# Patient Record
Sex: MALE | Race: BLACK OR AFRICAN AMERICAN | Employment: OTHER | URBAN - METROPOLITAN AREA
[De-identification: names, ages, dates, MRNs, and addresses within clinical notes are randomized per-mention and may not be internally consistent; named-entity substitution may affect disease eponyms.]

---

## 2020-11-22 ENCOUNTER — HOSPITAL ENCOUNTER (INPATIENT)
Facility: HOSPITAL | Age: 75
LOS: 5 days | Discharge: HOME | DRG: 246 | End: 2020-11-27
Attending: EMERGENCY MEDICINE | Admitting: HOSPITALIST
Payer: OTHER GOVERNMENT

## 2020-11-22 ENCOUNTER — APPOINTMENT (OUTPATIENT)
Dept: CT IMAGING | Facility: HOSPITAL | Age: 75
DRG: 246 | End: 2020-11-22
Attending: EMERGENCY MEDICINE
Payer: OTHER GOVERNMENT

## 2020-11-22 ENCOUNTER — APPOINTMENT (OUTPATIENT)
Dept: INTERVENTIONAL RADIOLOGY/VASCULAR | Facility: HOSPITAL | Age: 75
DRG: 246 | End: 2020-11-22
Attending: INTERNAL MEDICINE
Payer: OTHER GOVERNMENT

## 2020-11-22 ENCOUNTER — APPOINTMENT (OUTPATIENT)
Dept: GENERAL RADIOLOGY | Facility: HOSPITAL | Age: 75
DRG: 246 | End: 2020-11-22
Attending: INTERNAL MEDICINE
Payer: OTHER GOVERNMENT

## 2020-11-22 ENCOUNTER — APPOINTMENT (OUTPATIENT)
Dept: CV DIAGNOSTICS | Facility: HOSPITAL | Age: 75
DRG: 246 | End: 2020-11-22
Attending: INTERNAL MEDICINE
Payer: OTHER GOVERNMENT

## 2020-11-22 DIAGNOSIS — I21.3 ST ELEVATION MYOCARDIAL INFARCTION (STEMI), UNSPECIFIED ARTERY (HCC): Primary | ICD-10-CM

## 2020-11-22 PROCEDURE — 71275 CT ANGIOGRAPHY CHEST: CPT | Performed by: EMERGENCY MEDICINE

## 2020-11-22 PROCEDURE — 4A023N7 MEASUREMENT OF CARDIAC SAMPLING AND PRESSURE, LEFT HEART, PERCUTANEOUS APPROACH: ICD-10-PCS | Performed by: INTERNAL MEDICINE

## 2020-11-22 PROCEDURE — B2151ZZ FLUOROSCOPY OF LEFT HEART USING LOW OSMOLAR CONTRAST: ICD-10-PCS | Performed by: INTERNAL MEDICINE

## 2020-11-22 PROCEDURE — 74175 CTA ABDOMEN W/CONTRAST: CPT | Performed by: EMERGENCY MEDICINE

## 2020-11-22 PROCEDURE — B2111ZZ FLUOROSCOPY OF MULTIPLE CORONARY ARTERIES USING LOW OSMOLAR CONTRAST: ICD-10-PCS | Performed by: INTERNAL MEDICINE

## 2020-11-22 PROCEDURE — 99223 1ST HOSP IP/OBS HIGH 75: CPT | Performed by: INTERNAL MEDICINE

## 2020-11-22 PROCEDURE — 93306 TTE W/DOPPLER COMPLETE: CPT | Performed by: INTERNAL MEDICINE

## 2020-11-22 PROCEDURE — 027035Z DILATION OF CORONARY ARTERY, ONE ARTERY WITH TWO DRUG-ELUTING INTRALUMINAL DEVICES, PERCUTANEOUS APPROACH: ICD-10-PCS | Performed by: INTERNAL MEDICINE

## 2020-11-22 PROCEDURE — 71045 X-RAY EXAM CHEST 1 VIEW: CPT | Performed by: INTERNAL MEDICINE

## 2020-11-22 RX ORDER — FUROSEMIDE 10 MG/ML
INJECTION INTRAMUSCULAR; INTRAVENOUS
Status: COMPLETED
Start: 2020-11-22 | End: 2020-11-22

## 2020-11-22 RX ORDER — HEPARIN SODIUM AND DEXTROSE 10000; 5 [USP'U]/100ML; G/100ML
12 INJECTION INTRAVENOUS ONCE
Status: COMPLETED | OUTPATIENT
Start: 2020-11-22 | End: 2020-11-22

## 2020-11-22 RX ORDER — VERAPAMIL HYDROCHLORIDE 2.5 MG/ML
INJECTION, SOLUTION INTRAVENOUS
Status: COMPLETED
Start: 2020-11-22 | End: 2020-11-22

## 2020-11-22 RX ORDER — HEPARIN SODIUM AND DEXTROSE 10000; 5 [USP'U]/100ML; G/100ML
12 INJECTION INTRAVENOUS ONCE
Status: DISCONTINUED | OUTPATIENT
Start: 2020-11-22 | End: 2020-11-22

## 2020-11-22 RX ORDER — FUROSEMIDE 10 MG/ML
40 INJECTION INTRAMUSCULAR; INTRAVENOUS
Status: DISCONTINUED | OUTPATIENT
Start: 2020-11-22 | End: 2020-11-24

## 2020-11-22 RX ORDER — WARFARIN SODIUM 5 MG/1
5 TABLET ORAL NIGHTLY
Status: DISCONTINUED | OUTPATIENT
Start: 2020-11-22 | End: 2020-11-24

## 2020-11-22 RX ORDER — MIDAZOLAM HYDROCHLORIDE 1 MG/ML
INJECTION INTRAMUSCULAR; INTRAVENOUS
Status: COMPLETED
Start: 2020-11-22 | End: 2020-11-22

## 2020-11-22 RX ORDER — LIDOCAINE HYDROCHLORIDE 20 MG/ML
INJECTION, SOLUTION EPIDURAL; INFILTRATION; INTRACAUDAL; PERINEURAL
Status: COMPLETED
Start: 2020-11-22 | End: 2020-11-22

## 2020-11-22 RX ORDER — NITROGLYCERIN 20 MG/100ML
20 INJECTION INTRAVENOUS CONTINUOUS
Status: DISCONTINUED | OUTPATIENT
Start: 2020-11-22 | End: 2020-11-25 | Stop reason: ALTCHOICE

## 2020-11-22 RX ORDER — ASPIRIN 81 MG/1
324 TABLET, CHEWABLE ORAL ONCE
Status: COMPLETED | OUTPATIENT
Start: 2020-11-22 | End: 2020-11-22

## 2020-11-22 RX ORDER — METOPROLOL TARTRATE 5 MG/5ML
5 INJECTION INTRAVENOUS ONCE
Status: COMPLETED | OUTPATIENT
Start: 2020-11-22 | End: 2020-11-22

## 2020-11-22 RX ORDER — POTASSIUM CHLORIDE 20 MEQ/1
40 TABLET, EXTENDED RELEASE ORAL EVERY 4 HOURS
Status: COMPLETED | OUTPATIENT
Start: 2020-11-22 | End: 2020-11-22

## 2020-11-22 RX ORDER — HEPARIN SODIUM 5000 [USP'U]/ML
5000 INJECTION, SOLUTION INTRAVENOUS; SUBCUTANEOUS EVERY 8 HOURS SCHEDULED
Status: DISCONTINUED | OUTPATIENT
Start: 2020-11-22 | End: 2020-11-22

## 2020-11-22 RX ORDER — HEPARIN SODIUM 1000 [USP'U]/ML
INJECTION, SOLUTION INTRAVENOUS; SUBCUTANEOUS
Status: COMPLETED
Start: 2020-11-22 | End: 2020-11-22

## 2020-11-22 RX ORDER — TRAMADOL HYDROCHLORIDE 50 MG/1
100 TABLET ORAL EVERY 6 HOURS PRN
Status: DISCONTINUED | OUTPATIENT
Start: 2020-11-22 | End: 2020-11-27

## 2020-11-22 RX ORDER — HEPARIN SODIUM AND DEXTROSE 10000; 5 [USP'U]/100ML; G/100ML
INJECTION INTRAVENOUS CONTINUOUS
Status: DISCONTINUED | OUTPATIENT
Start: 2020-11-22 | End: 2020-11-27

## 2020-11-22 RX ORDER — CARVEDILOL 3.12 MG/1
3.12 TABLET ORAL ONCE
Status: COMPLETED | OUTPATIENT
Start: 2020-11-22 | End: 2020-11-22

## 2020-11-22 RX ORDER — MORPHINE SULFATE 4 MG/ML
4 INJECTION, SOLUTION INTRAMUSCULAR; INTRAVENOUS ONCE
Status: COMPLETED | OUTPATIENT
Start: 2020-11-22 | End: 2020-11-22

## 2020-11-22 RX ORDER — ISOSORBIDE MONONITRATE 30 MG/1
30 TABLET, EXTENDED RELEASE ORAL DAILY
Status: DISCONTINUED | OUTPATIENT
Start: 2020-11-22 | End: 2020-11-25

## 2020-11-22 RX ORDER — FAMOTIDINE 20 MG/1
20 TABLET ORAL DAILY
Status: DISCONTINUED | OUTPATIENT
Start: 2020-11-22 | End: 2020-11-25

## 2020-11-22 RX ORDER — ACETAMINOPHEN 325 MG/1
650 TABLET ORAL EVERY 4 HOURS PRN
Status: DISCONTINUED | OUTPATIENT
Start: 2020-11-22 | End: 2020-11-27

## 2020-11-22 RX ORDER — CARVEDILOL 6.25 MG/1
6.25 TABLET ORAL 2 TIMES DAILY WITH MEALS
Status: DISCONTINUED | OUTPATIENT
Start: 2020-11-22 | End: 2020-11-24

## 2020-11-22 RX ORDER — HEPARIN SODIUM 1000 [USP'U]/ML
5000 INJECTION, SOLUTION INTRAVENOUS; SUBCUTANEOUS ONCE
Status: COMPLETED | OUTPATIENT
Start: 2020-11-22 | End: 2020-11-22

## 2020-11-22 RX ORDER — TRAMADOL HYDROCHLORIDE 50 MG/1
50 TABLET ORAL EVERY 6 HOURS PRN
Status: DISCONTINUED | OUTPATIENT
Start: 2020-11-22 | End: 2020-11-27

## 2020-11-22 RX ORDER — ATORVASTATIN CALCIUM 40 MG/1
80 TABLET, FILM COATED ORAL NIGHTLY
Status: DISCONTINUED | OUTPATIENT
Start: 2020-11-22 | End: 2020-11-27

## 2020-11-22 RX ORDER — POTASSIUM CHLORIDE 20 MEQ/1
20 TABLET, EXTENDED RELEASE ORAL ONCE
Status: COMPLETED | OUTPATIENT
Start: 2020-11-22 | End: 2020-11-22

## 2020-11-22 RX ORDER — NITROGLYCERIN 20 MG/100ML
INJECTION INTRAVENOUS
Status: COMPLETED
Start: 2020-11-22 | End: 2020-11-22

## 2020-11-22 RX ORDER — FUROSEMIDE 10 MG/ML
20 INJECTION INTRAMUSCULAR; INTRAVENOUS ONCE
Status: COMPLETED | OUTPATIENT
Start: 2020-11-22 | End: 2020-11-22

## 2020-11-22 RX ORDER — ONDANSETRON 2 MG/ML
4 INJECTION INTRAMUSCULAR; INTRAVENOUS EVERY 6 HOURS PRN
Status: DISCONTINUED | OUTPATIENT
Start: 2020-11-22 | End: 2020-11-27

## 2020-11-22 RX ORDER — HYDRALAZINE HYDROCHLORIDE 25 MG/1
25 TABLET, FILM COATED ORAL EVERY 8 HOURS SCHEDULED
Status: DISCONTINUED | OUTPATIENT
Start: 2020-11-22 | End: 2020-11-24

## 2020-11-22 RX ORDER — ADENOSINE 3 MG/ML
INJECTION, SOLUTION INTRAVENOUS
Status: COMPLETED
Start: 2020-11-22 | End: 2020-11-22

## 2020-11-22 RX ORDER — HEPARIN SODIUM AND DEXTROSE 10000; 5 [USP'U]/100ML; G/100ML
INJECTION INTRAVENOUS CONTINUOUS
Status: CANCELLED | OUTPATIENT
Start: 2020-11-22

## 2020-11-22 RX ORDER — ONDANSETRON 2 MG/ML
INJECTION INTRAMUSCULAR; INTRAVENOUS
Status: COMPLETED
Start: 2020-11-22 | End: 2020-11-22

## 2020-11-22 RX ORDER — ASPIRIN 81 MG/1
81 TABLET ORAL DAILY
Status: DISCONTINUED | OUTPATIENT
Start: 2020-11-22 | End: 2020-11-27

## 2020-11-22 RX ORDER — CLOPIDOGREL BISULFATE 75 MG/1
600 TABLET ORAL ONCE
Status: COMPLETED | OUTPATIENT
Start: 2020-11-22 | End: 2020-11-22

## 2020-11-22 RX ORDER — CARVEDILOL 3.12 MG/1
3.12 TABLET ORAL 2 TIMES DAILY WITH MEALS
Status: DISCONTINUED | OUTPATIENT
Start: 2020-11-22 | End: 2020-11-22

## 2020-11-22 NOTE — H&P
Pulmonary/Critical Care Admission Note    HPI:   Elena Hoang is a 77year old male with Patient presents with:  Chest Pain Angina    None Pcp    Pt is a 76 yo with HTN and denies other med problem who presented with 24 hr of CP.  Pt was taken to cath lab Nitroglycerin in D5W 200-5 MCG/ML-% infusion, , ,     •  [COMPLETED] adenosine (ADENOCARD) 6 MG/2ML injection, , ,     •  [COMPLETED] eptifibatide (INTEGRILIN) 2 MG/ML bolus injection, , ,     •  [COMPLETED] Sterile Water for Injection injection, , ,     • Allergies    Social History:  Social History    Socioeconomic History      Marital status:        Spouse name: Not on file      Number of children: Not on file      Years of education: Not on file      Highest education level: Not on file    Tobacco

## 2020-11-22 NOTE — ED PROVIDER NOTES
Patient Seen in: [facility] AND [facility] Emergency Department      History   Patient presents with:  Chest Pain Angina    Stated Complaint: Chest pain    HPI    60-year-old male with history of no known medical problems here with complaints of chest pain for t (36.4 °C) (Temporal)   Resp (!) 28   Ht 172.7 cm (5' 8\")   Wt 83.9 kg   SpO2 93%   BMI 28.13 kg/m²         Physical Exam  Vitals signs and nursing note reviewed.    Constitutional:       Comments: Patient appears uncomfortable   HENT:      Head: [place]oce[place] American 38 (L) >=60    GFR, -American 43 (L) >=60   TROPONIN I    Collection Time: 11/22/20  1:19 AM   Result Value Ref Range    Troponin 69.500 (HH) <0.045 ng/mL   RAPID SARS-COV-2 BY PCR    Collection Time: 11/22/20  1:19 AM    Specimen: Nares; O Imaging Results Available and Reviewed by me while in ED:  No results found.       CT ANGIOGRAM CHEST  CT ANGIOGRAM ABDOMEN    COMPARISON: None    IMPRESSION:    CHEST:    There is a filling defect in the left ventricular lumen at the apex measuring 2 11/22/2020/DT:  11/22/2020      EMERGENCY DEPARTMENT COURSE AND TREATMENT:  Patient's condition was critical during Emergency Department evaluation.      75yoM with chest pain and back pain  - I personally reviewed and interpreted all the ED vitals  - afebr pressure. Medications Prescribed:  There are no discharge medications for this patient.

## 2020-11-22 NOTE — OPERATIVE REPORT
Preop diagnosis: anterior stemi  Post op diagnosis: same  Procedures: [facility] cors lv lad stent  Findings: lad 100% mid with PREMA 0 flow, d1 60-70%, rca mid to distal 75%  3.0 x 26 and 3.0 x 12 medtronic dandre to lad reducing lesion to 0%, prema 3 flow post proce

## 2020-11-22 NOTE — PROGRESS NOTES
ICU admission note    Patient admitted to the CCU from the catheterization lab. Presented to the ED for 24 hours of anterior chest pain and back pain as well as abdominal pain. STEMI noted at that time, cardiac alert called.   CT chest abdomen pelvis was

## 2020-11-22 NOTE — PROGRESS NOTES
[facility] Pharmacy Note:  Renal Dose Adjustment    Carlos Enrique Rodríguez has been prescribed famotidine (PEPCID) 20 mg orally every 12 hours. Estimated Creatinine Clearance: 35.8 mL/min (A) (based on SCr of 1.75 mg/dL (H)).     Calculated creatinine clearance is < 50

## 2020-11-22 NOTE — PLAN OF CARE
Problem: CARDIOVASCULAR - ADULT  Goal: Maintains optimal cardiac output and hemodynamic stability  Description: INTERVENTIONS:  - Monitor vital signs, rhythm, and trends  - Monitor for bleeding, hypotension and signs of decreased cardiac output  - Evalua planning and delivery of care  - Encourage patient/family to participate in care and decision-making at the level they choose  - Honor patient and family perspectives and choices  Outcome: Progressing

## 2020-11-22 NOTE — ED NOTES
Upon arrival to room, patient placed on monitor, defib pads placed. IV x 1 placed by [facility] EDT. 2nd IV placed to right hand by [employer].

## 2020-11-22 NOTE — PLAN OF CARE
Problem: CARDIOVASCULAR - ADULT  Goal: Maintains optimal cardiac output and hemodynamic stability  Description: INTERVENTIONS:  - Monitor vital signs, rhythm, and trends  - Monitor for bleeding, hypotension and signs of decreased cardiac output  - Evalua Problem: RESPIRATORY - ADULT  Goal: Achieves optimal ventilation and oxygenation  Description: INTERVENTIONS:  - Assess for changes in respiratory status  - Assess for changes in mentation and behavior  - Position to facilitate oxygenation and minimize r cognitive and physical deficits and behaviors that affect risk of falls.   - [place] fall precautions as indicated by assessment.  - Educate pt/family on patient safety including physical limitations  - Instruct pt to call for assistance with activity bas Comfort rounds provided. Belongings and call light within reach. Medication education provided. Bed alarm utilized throughout the shift.

## 2020-11-22 NOTE — PROGRESS NOTES
Follow-up note from earlier in the middle of the night. Patient is currently without chest pain or shortness of breath. Blood pressure is much improved but earlier when I saw him it was still in the 150s on a nitroglycerin drip.   He has been having some

## 2020-11-22 NOTE — CONSULTS
[facility]    PATIENT'S NAME: Shannan Kee   ATTENDING PHYSICIAN: Marcia Thornton MD   CONSULTING PHYSICIAN: Kumar EPPS DO   PATIENT ACCOUNT#:   [de-identified]    LOCATION:  [address]  MEDICAL RECORD #:   [id number]       DATE OF BIRTH:  0 myocardial infarction with late presentation at 24 hours with ongoing chest pain. The patient is going to be taken immediately to the cardiac catheterization lab.   Risks and benefits of the procedure were discussed, including bleeding, infection, inju

## 2020-11-22 NOTE — ED INITIAL ASSESSMENT (HPI)
Patient reports mid chest pain intermittent since Friday night but worsening tonight. Patient reports radiation of the pain to his back. +nausea. Denies shortness of breath or dizziness. Adds cough and fever/chills.

## 2020-11-22 NOTE — PROCEDURES
[facility]    PATIENT'S NAME: Maria Del Carmen Quijano   ATTENDING PHYSICIAN: Shad Gentile MD   OPERATING PHYSICIAN: Kumar Mahan DO   PATIENT ACCOUNT#:   [id number]    LOCATION:  [address] [place] [address] RECORD #:   [id number]       DATE OF BIRTH:  [birth year] appreciated. Before the occlusion of the LAD, there is a diagonal branch that has a proximal 60% to 70% borderline stenosis appreciated. However, there are several areas of stenosis in that vessel. It is also moderately calcified.   After we ballooned cu normal, ejection fraction estimated at 30% to 35%. There was a filling defect at the apex suggestive of a thrombus. The left ventricular end-diastolic pressure was 40 mmHg.   The patient received 60 mg of Lasix through the length of this procedure and ini

## 2020-11-23 ENCOUNTER — APPOINTMENT (OUTPATIENT)
Dept: ULTRASOUND IMAGING | Facility: HOSPITAL | Age: 75
DRG: 246 | End: 2020-11-23
Attending: HOSPITALIST
Payer: OTHER GOVERNMENT

## 2020-11-23 ENCOUNTER — APPOINTMENT (OUTPATIENT)
Dept: GENERAL RADIOLOGY | Facility: HOSPITAL | Age: 75
DRG: 246 | End: 2020-11-23
Attending: INTERNAL MEDICINE
Payer: OTHER GOVERNMENT

## 2020-11-23 PROCEDURE — 71045 X-RAY EXAM CHEST 1 VIEW: CPT | Performed by: INTERNAL MEDICINE

## 2020-11-23 PROCEDURE — 99233 SBSQ HOSP IP/OBS HIGH 50: CPT | Performed by: INTERNAL MEDICINE

## 2020-11-23 PROCEDURE — 76775 US EXAM ABDO BACK WALL LIM: CPT | Performed by: HOSPITALIST

## 2020-11-23 PROCEDURE — 99233 SBSQ HOSP IP/OBS HIGH 50: CPT | Performed by: HOSPITALIST

## 2020-11-23 RX ORDER — MAGNESIUM OXIDE 400 MG (241.3 MG MAGNESIUM) TABLET
1 TABLET NIGHTLY
Status: DISCONTINUED | OUTPATIENT
Start: 2020-11-23 | End: 2020-11-27

## 2020-11-23 RX ORDER — LISINOPRIL 2.5 MG/1
2.5 TABLET ORAL DAILY
Status: DISCONTINUED | OUTPATIENT
Start: 2020-11-23 | End: 2020-11-24

## 2020-11-23 NOTE — PROGRESS NOTES
cardiology progress note    24 h events patient is doing well, no CP or SOB.        •  melatonin tab TABS 1 mg, 1 mg, Oral, Nightly, Cathleen Klein MD    •  furosemide (LASIX) injection 40 mg, 40 mg, Intravenous, BID (Diuretic), Kumar Mahan, , 40 mg Pulse 86   Temp 98.3 °F (36.8 °C) (Temporal)   Resp 12   Ht 172.7 cm (5' 8\")   Wt 158 lb 8.2 oz (71.9 kg)   SpO2 94%   BMI 24.10 kg/m²   . HEENT:  Head is atraumatic. No scleral icterus appreciated. NECK:  Veins are not seen. LUNGS:  Clear.     H

## 2020-11-23 NOTE — PLAN OF CARE
Problem: CARDIOVASCULAR - ADULT  Goal: Maintains optimal cardiac output and hemodynamic stability  Description: INTERVENTIONS:  - Monitor vital signs, rhythm, and trends  - Monitor for bleeding, hypotension and signs of decreased cardiac output  - Evalua planning and delivery of care  - Encourage patient/family to participate in care and decision-making at the level they choose  - Honor patient and family perspectives and choices  Outcome: Progressing     Problem: RESPIRATORY - ADULT  Goal: Achieves optima Progressing     Problem: SAFETY ADULT - FALL  Goal: Free from fall injury  Description: INTERVENTIONS:  - Assess pt frequently for physical needs  - Identify cognitive and physical deficits and behaviors that affect risk of falls.   - [place] fall precaut

## 2020-11-23 NOTE — CM/SW NOTE
11/23/20 1100   CM/SW Referral Data   Referral Source Physician   Reason for Referral Other  (Advanced Directives)   Informant Patient   Patient Info   Advanced directives? No   Information provided?  Yes   Patient's Mental Status Alert;Oriented   Patien

## 2020-11-23 NOTE — PROGRESS NOTES
Pulmonary/Critical Care Follow Up Note    HPI:   Jeanne Wyatt is a 77year old male with Patient presents with:  Chest Pain Angina      PCP None Pcp  Admission Attending Osorio Leonardo MD    Hospital Day #1    Less sob and mild  No CP  No back pain  Izaiah 11/23/2020    HCT 43.1 11/23/2020    .0 11/23/2020    .0 11/23/2020    CREATSERUM 1.67 11/23/2020    BUN 23 11/23/2020     11/23/2020    K 4.1 11/23/2020     11/23/2020    CO2 25.0 11/23/2020     11/23/2020    CA 9.0 11/23/

## 2020-11-23 NOTE — PROGRESS NOTES
[place] [facility]    Progress Note    Orestes Magdaleno Patient Status:  Inpatient    3/19/1945 MRN [id number]   Location [facility] 2W/SW Attending Anh Matt MD   [address] Day # 1 PCP None Pcp       SUBJECTIVE:    Feeling okay.   No rocky (cpt=71045)    Result Date: 11/22/2020  CONCLUSION:  1. Heart size upper limits of normal, there is moderate pulmonary edema. Difficult to exclude underlying pneumonia. Correlate clinically and follow-up studies are advised.  The examination was read on c Warfarin Sodium (COUMADIN) tab 5 mg, 5 mg, Oral, Nightly          Assessment  Patient Active Problem List:     ST elevation myocardial infarction (STEMI) ([facility] [address]     ST elevation myocardial infarction (STEMI), unspecified artery ([facility] [address]      Plan:     STEMI/A

## 2020-11-23 NOTE — CDS QUERY
Dr. Nava Danger: To answer this query:   1. Click \"Edit\" button on the toolbar. 2. Type an \"X\" in the bracket for diagnosis(s) that apply. (You may also add additional clinical details as you feel necessary to substantiate your response.)  3.  Click on \"SIGN

## 2020-11-23 NOTE — PLAN OF CARE
Problem: CARDIOVASCULAR - ADULT  Goal: Maintains optimal cardiac output and hemodynamic stability  Description: INTERVENTIONS:  - Monitor vital signs, rhythm, and trends  - Monitor for bleeding, hypotension and signs of decreased cardiac output  - Evalua planning and delivery of care  - Encourage patient/family to participate in care and decision-making at the level they choose  - Honor patient and family perspectives and choices  Outcome: Progressing     Problem: RESPIRATORY - ADULT  Goal: Achieves optima Progressing     Problem: SAFETY ADULT - FALL  Goal: Free from fall injury  Description: INTERVENTIONS:  - Assess pt frequently for physical needs  - Identify cognitive and physical deficits and behaviors that affect risk of falls.   - [place] fall precaut

## 2020-11-24 PROCEDURE — 99232 SBSQ HOSP IP/OBS MODERATE 35: CPT | Performed by: INTERNAL MEDICINE

## 2020-11-24 PROCEDURE — 99233 SBSQ HOSP IP/OBS HIGH 50: CPT | Performed by: HOSPITALIST

## 2020-11-24 PROCEDURE — 99223 1ST HOSP IP/OBS HIGH 75: CPT | Performed by: INTERNAL MEDICINE

## 2020-11-24 RX ORDER — POTASSIUM CHLORIDE 20 MEQ/1
40 TABLET, EXTENDED RELEASE ORAL ONCE
Status: COMPLETED | OUTPATIENT
Start: 2020-11-24 | End: 2020-11-24

## 2020-11-24 RX ORDER — CARVEDILOL 3.12 MG/1
3.12 TABLET ORAL 2 TIMES DAILY WITH MEALS
Status: DISCONTINUED | OUTPATIENT
Start: 2020-11-25 | End: 2020-11-25

## 2020-11-24 RX ORDER — MORPHINE SULFATE 2 MG/ML
2 INJECTION, SOLUTION INTRAMUSCULAR; INTRAVENOUS EVERY 4 HOURS PRN
Status: DISCONTINUED | OUTPATIENT
Start: 2020-11-24 | End: 2020-11-27

## 2020-11-24 RX ORDER — POTASSIUM CHLORIDE 20 MEQ/1
20 TABLET, EXTENDED RELEASE ORAL ONCE
Status: COMPLETED | OUTPATIENT
Start: 2020-11-24 | End: 2020-11-24

## 2020-11-24 RX ORDER — NITROGLYCERIN 0.4 MG/1
TABLET SUBLINGUAL
Status: COMPLETED
Start: 2020-11-24 | End: 2020-11-24

## 2020-11-24 RX ORDER — HYDRALAZINE HYDROCHLORIDE 10 MG/1
10 TABLET, FILM COATED ORAL EVERY 8 HOURS SCHEDULED
Status: DISCONTINUED | OUTPATIENT
Start: 2020-11-24 | End: 2020-11-27

## 2020-11-24 RX ORDER — MORPHINE SULFATE 2 MG/ML
INJECTION, SOLUTION INTRAMUSCULAR; INTRAVENOUS
Status: DISPENSED
Start: 2020-11-24 | End: 2020-11-24

## 2020-11-24 NOTE — PLAN OF CARE
Problem: CARDIOVASCULAR - ADULT  Goal: Maintains optimal cardiac output and hemodynamic stability  Description: INTERVENTIONS:  - Monitor vital signs, rhythm, and trends  - Monitor for bleeding, hypotension and signs of decreased cardiac output  - Evalua planning and delivery of care  - Encourage patient/family to participate in care and decision-making at the level they choose  - Honor patient and family perspectives and choices  Outcome: Progressing     Problem: RESPIRATORY - ADULT  Goal: Achieves optima Progressing     Problem: SAFETY ADULT - FALL  Goal: Free from fall injury  Description: INTERVENTIONS:  - Assess pt frequently for physical needs  - Identify cognitive and physical deficits and behaviors that affect risk of falls.   - [place] fall precaut

## 2020-11-24 NOTE — PROGRESS NOTES
Patient complained of mild l sided chest pain this morning. No shortness of breath. Was given two sublingual and pain resolved. EKG reveal ant lat st elevation that was noted on previous ekg's, less severe than ekg from presentation.   If pain recurs mac

## 2020-11-24 NOTE — PROGRESS NOTES
[place] [facility] - [facility]     Progress Note        Santiago Lara Patient Status:  Inpatient    3/19/1945 MRN [id number]   Location [facility] 2W/SW Attending Cesar Woodson MD   [address] Day # 2 PCP None Pcp       Subjective:   Patient seen and ex with meals    •  heparin (PORCINE) drip 49295sipvz/250mL infusion CONTINUOUS, 200-3,000 Units/hr, Intravenous, Continuous    •  famoTIDine (PEPCID) tab 20 mg, 20 mg, Oral, Daily    •  Warfarin Sodium (COUMADIN) tab 5 mg, 5 mg, Oral, Nightly        Continuo nature  -Diuresis as tolerated  -Closely monitor renal function urine output  -Okay to go to floor if okay from cardiology perspective  -No active pulmonary issues. Will sign off.   Patient followed by hospitalist.    Kendell Shepard DO  Pulmonary Critica

## 2020-11-24 NOTE — PROGRESS NOTES
[place] [facility] - [facility]    Progress Note    Dmitriy Fitzgerald Patient Status:  Inpatient    3/19/1945 MRN [id number]   Location [facility] 2W/SW Attending uLz Herr MD   [address] Day # 2 PCP None Pcp       Subjective:   Dmitriy Fitzgerald is had rommel mg Oral Nightly   • carvedilol  6.25 mg Oral BID with meals   • famotidine  20 mg Oral Daily       Current PRN Inpatient Meds:      morphINE sulfate, acetaminophen **OR** traMADol HCl **OR** traMADol HCl, ondansetron HCl    Results:     Recent Labs   Lab 1 75% RCA and 60% D1 stenosis to have PCI in future. - Cont ASA, lipitor, coreg, hydralazine, imdur, brilinta. - ECHO reviewed EF 25-30%. Will need life vest on discharge  - hold ACEI at this time due to NETO and hold on starting aldactone for now.    - Ho

## 2020-11-24 NOTE — CONSULTS
[facility]    Report of Consultation    Date of Admission:  11/22/2020  Date of Consult:  11/24/2020   Reason for Consultation:     NETO on CKD     History of Present Illness:     Patient is a 77 yrs old male with pmh of HTN who presented (ULTRAM) tab 50 mg, 50 mg, Oral, Q6H PRN    Or    •  traMADol HCl (ULTRAM) tab 100 mg, 100 mg, Oral, Q6H PRN    •  ondansetron HCl (ZOFRAN) injection 4 mg, 4 mg, Intravenous, Q6H PRN    •  carvedilol (COREG) tab 6.25 mg, 6.25 mg, Oral, BID with meals    • clear to auscultation bilaterally  Cardiovascular: S1, S2 normal, no rub  Abdominal: soft, non-tender; non distended  Extremities: no edema  Skin: No rashes or lesions  Lymph nodes: Cervical, supraclavicular normal  Neurologic: Grossly normal  Psychiatric: 11/24/2020    PHURINE 5.0 11/24/2020    PROUR Negative 11/24/2020    UROBILINOGEN <2.0 11/24/2020    NITRITE Negative 11/24/2020    LEUUR Negative 11/24/2020           Impression:       1.  NETO on CKD stage III:  - baseline creatinine unknown  -Lab test on

## 2020-11-24 NOTE — PROGRESS NOTES
cardiology progress note    24 h events patient is doing well, no CP or SOB. Few runs of NSVT and SVT.      •  melatonin tab TABS 1 mg, 1 mg, Oral, Nightly, Prince Brewer MD    •  furosemide (LASIX) injection 40 mg, 40 mg, Intravenous, BID (Diuretic), Se Location: Right arm)   Pulse 76   Temp 96.8 °F (36 °C) (Temporal)   Resp 20   Ht 172.7 cm (5' 8\")   Wt 158 lb 8.2 oz (71.9 kg)   SpO2 98%   BMI 24.10 kg/m²   . HEENT:  Head is atraumatic. No scleral icterus appreciated. NECK:  Veins are not seen.

## 2020-11-24 NOTE — PROGRESS NOTES
Pt c/o of chest pain non radiating  3x within less than 10 minutes of each other. EKG ordered/completed, nitroSubL 2x given. O2 -2L applied. [address] notified. Morphine 2mg- given, pt states \"the pain went away when I got that pill\" KingMD at bedside.

## 2020-11-25 PROCEDURE — 99233 SBSQ HOSP IP/OBS HIGH 50: CPT | Performed by: INTERNAL MEDICINE

## 2020-11-25 PROCEDURE — 99233 SBSQ HOSP IP/OBS HIGH 50: CPT | Performed by: HOSPITALIST

## 2020-11-25 RX ORDER — NITROGLYCERIN 0.4 MG/1
0.4 TABLET SUBLINGUAL EVERY 5 MIN PRN
Status: DISCONTINUED | OUTPATIENT
Start: 2020-11-25 | End: 2020-11-27

## 2020-11-25 RX ORDER — POTASSIUM CHLORIDE 20 MEQ/1
40 TABLET, EXTENDED RELEASE ORAL ONCE
Status: COMPLETED | OUTPATIENT
Start: 2020-11-25 | End: 2020-11-25

## 2020-11-25 RX ORDER — PANTOPRAZOLE SODIUM 40 MG/1
40 TABLET, DELAYED RELEASE ORAL
Status: DISCONTINUED | OUTPATIENT
Start: 2020-11-26 | End: 2020-11-27

## 2020-11-25 RX ORDER — CARVEDILOL 6.25 MG/1
6.25 TABLET ORAL 2 TIMES DAILY WITH MEALS
Status: DISCONTINUED | OUTPATIENT
Start: 2020-11-25 | End: 2020-11-26

## 2020-11-25 RX ORDER — ISOSORBIDE MONONITRATE 60 MG/1
60 TABLET, EXTENDED RELEASE ORAL DAILY
Status: DISCONTINUED | OUTPATIENT
Start: 2020-11-26 | End: 2020-11-27

## 2020-11-25 NOTE — PROGRESS NOTES
[facility]    [facility] Note    Dell Nasuti Patient Status:  Inpatient    3/19/1945 MRN [id number]   Location [facility] 2W/SW Attending Suzan Sutherland MD   [facility] Day # 3 PCP None Pcp       Subjective:   Dell Quarlesyesenia is feeling HCl, ondansetron HCl    Results:     Recent Labs   Lab 11/22/20  0119 11/22/20  0119 11/23/20  0407 11/24/20  0624 11/25/20  0758   RBC 4.58   < > 4.45 3.97 3.90   HGB 14.9   < > 14.2 12.8* 12.5*   HCT 44.5   < > 43.1 38.2* 36.8*   MCV 97.2   < > 96.9 96.2 wall STEMI s/p LAD PCI. Also with 75% RCA and 60% D1 stenosis to have PCI in future. - Cont ASA, lipitor, coreg, hydralazine, imdur, brilinta. - ECHO reviewed EF 25-30%.  Will need life vest on discharge  - hold ACEI at this time due to NETO and hold on

## 2020-11-25 NOTE — PROGRESS NOTES
[facility]    Progress Note      Subjective:     Patient is lying comfortably -no chest pain or sob .  Had CP overnight       Review of Systems:     Constitutional: negative for fatigue, fevers and weight loss  Eyes: negative for irritatio hydrALAzine HCl (APRESOLINE) tab 10 mg, 10 mg, Oral, Q8H [facility]    •  carvedilol (COREG) tab 3.125 mg, 3.125 mg, Oral, BID with meals    •  melatonin tab TABS 1 mg, 1 mg, Oral, Nightly    •  Isosorbide Mononitrate ER (IMDUR) 24 hr tab 30 mg, 30 mg, Oral, Char deficiency, vitamin K deficiency,  factor inhibitors, liver disease, etc.  Clinical correlation is recommended.        INR   Date Value Ref Range Status   11/25/2020 1.30 (H) 0.90 - 1.20 Final     Comment:     Only the INR (not the Protime value) should be 14:05 by [employer] and Plan:       1.  ENTO on CKD stage III: non oliguric but poor urine output  - baseline creatinine unknown  - Lab test on admission showed BN/Cr 15/1.75 mg/dl with an eGFR 38 ml/min. -> 2.29 mg/dl and stable today

## 2020-11-25 NOTE — PROGRESS NOTES
Pt transferred to telemetry room 338 by bed, belongings w/pt & pt's dghtr here. Report called earlier to tele RN, no change status.  Skin check & heparin infusion rate verified by Neville Arana RN, pt has ecchymosis at R groin site, stable through shift, soft area

## 2020-11-25 NOTE — PLAN OF CARE
Pt received from CCU @ 1930. VSS, on 2L O2 NC. Pt here from home to ED with STEMI & had 2 stents placed to LAD. Heparin gtt @ 9ml/hr. Pt receiving PO coumadin. PTT and PT in AM to check levels. Can be off heparin once coumadin levels therapeutic.  KRISTOPHER jordan specific interventions  Outcome: Progressing     Problem: Patient Centered Care  Goal: Patient preferences are identified and integrated in the patient's plan of care  Description: Interventions:  - What would you like us to know as we care for you?  I have opioid side effects  - Notify MD/LIP if interventions unsuccessful or patient reports new pain  - Anticipate increased pain with activity and pre-medicate as appropriate  Outcome: Progressing     Problem: RISK FOR INFECTION - ADULT  Goal: Absence of fever/ system  Outcome: Progressing

## 2020-11-25 NOTE — PLAN OF CARE
Pt w/L-sided chest heaviness at time of shift bedside check, pt was slouched down in bed, discomfort resolved w/repositioning pt, no other discomfort. Heparin at 900 units/hr, PTT therapeutic on ACS protocol.  Sleep apnea noted w/mild desaturation, O2 requi Short Term Goal: Relief of chest pain    Interventions:   - Nitro, PRN pain medication  - See additional Care Plan goals for specific interventions  Outcome: Progressing     Problem: RESPIRATORY - ADULT  Goal: Achieves optimal ventilation and oxygenation

## 2020-11-25 NOTE — PROGRESS NOTES
Pt c/o chest pain 8/10 to left chest, Hospitalist paged for orders. PRN nitro given x1. Effective- BP stable. Pt c/o chest pain again 30 mins later 10/10. PRN nitro given. -effective. Cardio paged, awaiting response.

## 2020-11-25 NOTE — PLAN OF CARE
VSS. NSR. Currently on room air, sats maintained. Up with standby assist. Heparin drip to bridge to coumadin. No pain at this time. Earlier complaint of chest pain resolved within seconds, no nitro given. Life vest approved pending discharge.     Problem: C What would you like us to know as we care for you?  I have a big family.  - Provide timely, complete, and accurate information to patient/family  - Incorporate patient and family knowledge, values, beliefs, and cultural backgrounds into the planning and del Problem: RISK FOR INFECTION - ADULT  Goal: Absence of fever/infection during anticipated neutropenic period  Description: INTERVENTIONS  - Monitor WBC  - Administer growth factors as ordered  - Implement neutropenic guidelines  Outcome: Progressing     P

## 2020-11-26 ENCOUNTER — APPOINTMENT (OUTPATIENT)
Dept: GENERAL RADIOLOGY | Facility: HOSPITAL | Age: 75
DRG: 246 | End: 2020-11-26
Attending: HOSPITALIST
Payer: OTHER GOVERNMENT

## 2020-11-26 PROCEDURE — 99233 SBSQ HOSP IP/OBS HIGH 50: CPT | Performed by: HOSPITALIST

## 2020-11-26 PROCEDURE — 99233 SBSQ HOSP IP/OBS HIGH 50: CPT | Performed by: INTERNAL MEDICINE

## 2020-11-26 PROCEDURE — 71045 X-RAY EXAM CHEST 1 VIEW: CPT | Performed by: HOSPITALIST

## 2020-11-26 RX ORDER — METOPROLOL SUCCINATE 25 MG/1
25 TABLET, EXTENDED RELEASE ORAL
Status: DISCONTINUED | OUTPATIENT
Start: 2020-11-26 | End: 2020-11-27

## 2020-11-26 RX ORDER — WARFARIN SODIUM 4 MG/1
8 TABLET ORAL NIGHTLY
Status: DISCONTINUED | OUTPATIENT
Start: 2020-11-26 | End: 2020-11-27

## 2020-11-26 RX ORDER — FUROSEMIDE 10 MG/ML
40 INJECTION INTRAMUSCULAR; INTRAVENOUS ONCE
Status: COMPLETED | OUTPATIENT
Start: 2020-11-26 | End: 2020-11-26

## 2020-11-26 RX ORDER — BENZONATATE 100 MG/1
100 CAPSULE ORAL 3 TIMES DAILY PRN
Status: DISCONTINUED | OUTPATIENT
Start: 2020-11-26 | End: 2020-11-27

## 2020-11-26 NOTE — PLAN OF CARE
Pt vss overnight. No c/o chest pain. Pt c/o nausea and cough. PRN zofran and tessalon pearls given. Heparin gtt @ 9 ml/hr- PTT in AM. Pt also on PO coumadin and is being bridged to coumadin only. Last INR was 1.3.  Plan to d/c home once INR therapeutic, kid Care  Goal: Patient preferences are identified and integrated in the patient's plan of care  Description: Interventions:  - What would you like us to know as we care for you?  I have a big family.  - Provide timely, complete, and accurate information to pat reports new pain  - Anticipate increased pain with activity and pre-medicate as appropriate  Outcome: Progressing     Problem: RISK FOR INFECTION - ADULT  Goal: Absence of fever/infection during anticipated neutropenic period  Description: INTERVENTIONS  -

## 2020-11-26 NOTE — PROGRESS NOTES
[facility]    Progress Note    Dell Andrews Patient Status:  Inpatient    3/19/1945 MRN [id number]   Location [facility] 3W/SW Attending Suzan Sutherland MD   [facility] Day # 4 PCP None Pcp       Subjective:   Dell Andrews is a(n) 77 Trace edema  Neurological:  Grossly normal    Results:     Laboratory Data:  Lab Results   Component Value Date    WBC 16.0 (H) 11/26/2020    HGB 12.8 (L) 11/26/2020    HCT 38.4 (L) 11/26/2020    .0 11/26/2020    CREATSERUM 2.01 (H) 11/26/2020    BU

## 2020-11-26 NOTE — PROGRESS NOTES
[place] [facility] - [facility]    Progress Note    Ran Negro Patient Status:  Inpatient    3/19/1945 MRN [id number]   Location [facility] 2W/SW Attending Nanette Muñoz MD   [facility] Day # 4 PCP None Pcp       Subjective:   Ran Negro did not sepulveda Sodium  40 mg Oral QAM AC   • hydrALAzine HCl  10 mg Oral Q8H [address]   • melatonin  1 mg Oral Nightly   • ticagrelor  90 mg Oral Q12H   • aspirin  81 mg Oral Daily   • atorvastatin  80 mg Oral Nightly       Current PRN Inpatient Meds:      benzonatate, nitroG infarct. ABNORMAL When compared with ECG of 11/24/2020 06:11:49 PVC is no longer seen Electronically signed on 11/25/2020 at 10:42 by MART Jones     Assessment and Plan:     STEMI   ACS  Ischemic cardiomyopathy/Acute HFrEF  - Anterior wall STEMI s/p LAD P

## 2020-11-26 NOTE — PROGRESS NOTES
Patient seen in follow up. Patient denies any chest pain or sob. Few runs of NSVT and had CP in the morning only when he wakes up starts from back to front, sometimes the other way he is not sure. Does not occur with activity.    11/25/20  1505   BP: •  heparin (PORCINE) drip 81495iuqew/250mL infusion CONTINUOUS, 200-3,000 Units/hr, Intravenous, Continuous      No medications prior to admission. No results found.     Lab Results   Component Value Date    WBC 14.4 11/25/2020    HGB 12.5 11/25/2020

## 2020-11-26 NOTE — PROGRESS NOTES
Patient seen in follow up. Patient denies any chest pain or sob.     11/26/20  0805   BP: 153/81   Pulse: 90   Resp: 19   Temp: 98 °F (36.7 °C)       Intake/Output Summary (Last 24 hours) at 11/26/2020 1258  Last data filed at 11/26/2020 0847  Gross p •  heparin (PORCINE) drip 65182uvtue/250mL infusion CONTINUOUS, 200-3,000 Units/hr, Intravenous, Continuous      No medications prior to admission.     Xr Chest Ap Portable  (cpt=71045)    Result Date: 11/26/2020  CONCLUSION:   Right lower lobe airspace opa

## 2020-11-26 NOTE — PROGRESS NOTES
Life Vest approved. [facility] [phone] notified. Will send somebody to see the pt today.

## 2020-11-26 NOTE — PLAN OF CARE
Problem: CARDIOVASCULAR - ADULT  Goal: Maintains optimal cardiac output and hemodynamic stability  Description: INTERVENTIONS:  - Monitor vital signs, rhythm, and trends  - Monitor for bleeding, hypotension and signs of decreased cardiac output  - Evalua planning and delivery of care  - Encourage patient/family to participate in care and decision-making at the level they choose  - Honor patient and family perspectives and choices  Outcome: Progressing     Problem: RESPIRATORY - ADULT  Goal: Achieves optima Progressing     Problem: SAFETY ADULT - FALL  Goal: Free from fall injury  Description: INTERVENTIONS:  - Assess pt frequently for physical needs  - Identify cognitive and physical deficits and behaviors that affect risk of falls.   - [place] fall precaut

## 2020-11-27 ENCOUNTER — TELEPHONE (OUTPATIENT)
Dept: NEPHROLOGY | Facility: CLINIC | Age: 75
End: 2020-11-27

## 2020-11-27 VITALS
SYSTOLIC BLOOD PRESSURE: 127 MMHG | WEIGHT: 173.63 LBS | OXYGEN SATURATION: 97 % | BODY MASS INDEX: 26.32 KG/M2 | RESPIRATION RATE: 16 BRPM | DIASTOLIC BLOOD PRESSURE: 71 MMHG | HEIGHT: 68 IN | HEART RATE: 72 BPM | TEMPERATURE: 98 F

## 2020-11-27 PROCEDURE — 99239 HOSP IP/OBS DSCHRG MGMT >30: CPT | Performed by: INTERNAL MEDICINE

## 2020-11-27 PROCEDURE — 99233 SBSQ HOSP IP/OBS HIGH 50: CPT | Performed by: INTERNAL MEDICINE

## 2020-11-27 RX ORDER — ACETAMINOPHEN 325 MG/1
650 TABLET ORAL EVERY 4 HOURS PRN
Status: DISCONTINUED | OUTPATIENT
Start: 2020-11-27 | End: 2020-11-27

## 2020-11-27 RX ORDER — FUROSEMIDE 40 MG/1
40 TABLET ORAL
Qty: 60 TABLET | Refills: 0 | Status: SHIPPED | OUTPATIENT
Start: 2020-11-27 | End: 2020-12-27

## 2020-11-27 RX ORDER — LEVOFLOXACIN 250 MG/1
250 TABLET ORAL DAILY
Qty: 7 TABLET | Refills: 0 | Status: SHIPPED | OUTPATIENT
Start: 2020-11-27 | End: 2020-12-04

## 2020-11-27 RX ORDER — TRAMADOL HYDROCHLORIDE 50 MG/1
100 TABLET ORAL EVERY 12 HOURS PRN
Status: DISCONTINUED | OUTPATIENT
Start: 2020-11-27 | End: 2020-11-27

## 2020-11-27 RX ORDER — WARFARIN SODIUM 3 MG/1
6 TABLET ORAL NIGHTLY
Status: DISCONTINUED | OUTPATIENT
Start: 2020-11-27 | End: 2020-11-27

## 2020-11-27 RX ORDER — NITROGLYCERIN 0.4 MG/1
TABLET SUBLINGUAL
Status: COMPLETED
Start: 2020-11-27 | End: 2020-11-27

## 2020-11-27 RX ORDER — ACETAMINOPHEN 325 MG/1
650 TABLET ORAL EVERY 4 HOURS PRN
Refills: 0 | Status: SHIPPED | COMMUNITY
Start: 2020-11-27

## 2020-11-27 RX ORDER — HYDRALAZINE HYDROCHLORIDE 10 MG/1
10 TABLET, FILM COATED ORAL EVERY 8 HOURS SCHEDULED
Qty: 90 TABLET | Refills: 0 | Status: SHIPPED | OUTPATIENT
Start: 2020-11-27 | End: 2020-12-27

## 2020-11-27 RX ORDER — WARFARIN SODIUM 6 MG/1
6 TABLET ORAL NIGHTLY
Qty: 30 TABLET | Refills: 0 | Status: SHIPPED | OUTPATIENT
Start: 2020-11-27

## 2020-11-27 RX ORDER — POTASSIUM CHLORIDE 20 MEQ/1
40 TABLET, EXTENDED RELEASE ORAL ONCE
Status: COMPLETED | OUTPATIENT
Start: 2020-11-27 | End: 2020-11-27

## 2020-11-27 RX ORDER — METOPROLOL SUCCINATE 25 MG/1
25 TABLET, EXTENDED RELEASE ORAL
Qty: 60 TABLET | Refills: 0 | Status: SHIPPED | OUTPATIENT
Start: 2020-11-27 | End: 2020-12-27

## 2020-11-27 RX ORDER — ATORVASTATIN CALCIUM 80 MG/1
80 TABLET, FILM COATED ORAL NIGHTLY
Qty: 30 TABLET | Refills: 0 | Status: SHIPPED | OUTPATIENT
Start: 2020-11-27

## 2020-11-27 RX ORDER — PANTOPRAZOLE SODIUM 40 MG/1
40 TABLET, DELAYED RELEASE ORAL
Qty: 30 TABLET | Refills: 0 | Status: SHIPPED | OUTPATIENT
Start: 2020-11-28

## 2020-11-27 RX ORDER — TRAMADOL HYDROCHLORIDE 50 MG/1
50 TABLET ORAL EVERY 12 HOURS PRN
Status: DISCONTINUED | OUTPATIENT
Start: 2020-11-27 | End: 2020-11-27

## 2020-11-27 RX ORDER — ISOSORBIDE MONONITRATE 60 MG/1
60 TABLET, EXTENDED RELEASE ORAL DAILY
Qty: 30 TABLET | Refills: 0 | Status: SHIPPED | OUTPATIENT
Start: 2020-11-28

## 2020-11-27 RX ORDER — ASPIRIN 81 MG/1
81 TABLET ORAL DAILY
Qty: 30 TABLET | Refills: 0 | Status: SHIPPED | OUTPATIENT
Start: 2020-11-28 | End: 2020-11-27

## 2020-11-27 RX ORDER — RANOLAZINE 500 MG/1
500 TABLET, EXTENDED RELEASE ORAL 2 TIMES DAILY
Status: DISCONTINUED | OUTPATIENT
Start: 2020-11-27 | End: 2020-11-27

## 2020-11-27 RX ORDER — NITROGLYCERIN 0.4 MG/1
0.4 TABLET SUBLINGUAL EVERY 5 MIN PRN
Qty: 14 TABLET | Refills: 0 | Status: SHIPPED | OUTPATIENT
Start: 2020-11-27

## 2020-11-27 RX ORDER — FUROSEMIDE 40 MG/1
40 TABLET ORAL
Status: DISCONTINUED | OUTPATIENT
Start: 2020-11-27 | End: 2020-11-27

## 2020-11-27 RX ORDER — RANOLAZINE 500 MG/1
500 TABLET, EXTENDED RELEASE ORAL 2 TIMES DAILY
Qty: 60 TABLET | Refills: 0 | Status: SHIPPED | OUTPATIENT
Start: 2020-11-27 | End: 2020-12-27

## 2020-11-27 NOTE — PROGRESS NOTES
Patient seen in follow up. Patient denies any chest pain or sob.     11/27/20  1256   BP: 117/74   Pulse: 70   Resp: 18   Temp:        Intake/Output Summary (Last 24 hours) at 11/27/2020 1410  Last data filed at 11/27/2020 1136  Gross per 24 hour   In •  ondansetron HCl (ZOFRAN) injection 4 mg, 4 mg, Intravenous, Q6H PRN      No medications prior to admission.     Xr Chest Ap Portable  (cpt=71045)    Result Date: 11/26/2020  CONCLUSION:   Right lower lobe airspace opacity is new since the prior exam and

## 2020-11-27 NOTE — PLAN OF CARE
@ 9938 :Pt.complained of mid chest pain radiating to the back. No nausea,diaphoresis,some shortness of breath. (see flow sheet for vitals). ECG done. [facility] notified. Nitro 1 tablet SL given. Pain was relieved after intervention.     @ [id number] - Pt.again comp

## 2020-11-27 NOTE — PLAN OF CARE
Problem: CARDIOVASCULAR - ADULT  Goal: Maintains optimal cardiac output and hemodynamic stability  Description: INTERVENTIONS:  - Monitor vital signs, rhythm, and trends  - Monitor for bleeding, hypotension and signs of decreased cardiac output  - Evaluate request assistance  - Assess pain using appropriate pain scale  - Administer analgesics based on type and severity of pain and evaluate response  - Implement non-pharmacological measures as appropriate and evaluate response  - Consider cultural and social

## 2020-11-27 NOTE — PROGRESS NOTES
[facility] Pharmacy Note:  Renal Dose Adjustment for Tramadol Sera Chin    Nae Godoy has been prescribed Tramadol (ULTRAM) 100 mg orally every 6 hours as needed for pain. Estimated Creatinine Clearance: 27.7 mL/min (A) (based on SCr of 2.26 mg/dL (H)).     H

## 2020-11-27 NOTE — DISCHARGE SUMMARY
[place] [facility]    Discharge Summary    Kuldeep Parkinson Patient Status:  Inpatient    3/19/1945 MRN [id number]   Location [facility] 3W/SW Attending Tal Lai, [address] [facility] [address] Day # 5 PCP None Pcp     Date of Admission: 2020 70   Resp:   18 18   Temp:  98.2 °F (36.8 °C)     TempSrc:  Oral     SpO2:   92% 94%   Weight:       Height:         Patient Weight for the past 72 hrs:   Weight   11/25/20 0549 177 lb (80.3 kg)   11/26/20 0630 180 lb 9.6 oz (81.9 kg)   11/27/20 0414 173 l clinically and follow-up studies are advised. The examination was read on call by [facility] with a similar interpretation given.     Dictated by (CST): Demetrio Bhandari MD on 11/22/2020 at 8:46 AM     Finalized by (CST): Demetrio Bhandari MD on 1 --    GFRNAA  --  28* 32* 28*  --    CA  --  8.9 9.3 8.9  --    ALB 2.9* 2.7*  --  2.9*  --    NA  --  135* 134* 136  --    K 3.7 3.5 4.0 3.5 4.0   CL  --  102 101 101  --    CO2  --  25.0 25.0 27.0  --      Lab Results   Component Value Date    INR 2.50 ( tablet  Refills: 0     Pantoprazole Sodium 40 MG Tbec  Commonly known as: PROTONIX  Start taking on: November 28, 2020      Take 1 tablet (40 mg total) by mouth every morning before breakfast.   Quantity: 30 tablet  Refills: 0     Ranolazine  MG Tb12 (English)  Heart Attack or Angina, Recognizing a (English)  Heart Attack, Symptoms of a (English)  Heart Failure (English)  Heart Failure: Taking Medicine to Control (English)  Know the Medicines You're Taking (English)  Carvedilol tablets (English)  Isoso

## 2020-11-27 NOTE — PLAN OF CARE
Went through discharge in detail with patient and daughter Bertha Abbott. Instructions for post cath, MI, and CHF given and discussed in detail as well. All side effects of medicatons reviewed with patient and daughter. All questions answered.  This writer had to

## 2020-11-28 NOTE — TELEPHONE ENCOUNTER
Please book appointment with Dr. Anabel Mayorga for patient to see her in next 2 weeks for renal follow-up from hospital thank you

## 2020-11-28 NOTE — PROGRESS NOTES
[place] [facility] - [facility]    Progress Note    Mono Mckeon Patient Status:  Inpatient    3/19/1945 MRN [id number]   Location [facility] 3W/SW Attending Akbar Cervantes MD   [address] Day # 5 PCP None Pcp       Subjective:   Mono Mckeon is a(n) abnormal bruising noted  Back/Spine: no abnormalities noted  Musculoskeletal: full ROM all extremities good strength  no deformities  Extremities: no edema, cyanosis  Neurological:  Grossly normal    Results:     Laboratory Data:  Lab Results   Component V

## 2020-12-01 ENCOUNTER — PATIENT OUTREACH (OUTPATIENT)
Dept: CASE MANAGEMENT | Age: 75
End: 2020-12-01

## 2021-03-04 DIAGNOSIS — Z23 NEED FOR VACCINATION: ICD-10-CM

## 2021-05-21 ENCOUNTER — HOSPITAL ENCOUNTER (EMERGENCY)
Facility: HOSPITAL | Age: 76
Discharge: HOME | End: 2021-05-21
Attending: EMERGENCY MEDICINE
Payer: OTHER GOVERNMENT

## 2021-05-21 VITALS
BODY MASS INDEX: 22 KG/M2 | HEART RATE: 87 BPM | RESPIRATION RATE: 18 BRPM | WEIGHT: 145 LBS | SYSTOLIC BLOOD PRESSURE: 140 MMHG | TEMPERATURE: 97 F | DIASTOLIC BLOOD PRESSURE: 69 MMHG | OXYGEN SATURATION: 100 %

## 2021-05-21 DIAGNOSIS — K59.00 CONSTIPATION, UNSPECIFIED CONSTIPATION TYPE: Primary | ICD-10-CM

## 2021-05-21 PROCEDURE — 99283 EMERGENCY DEPT VISIT LOW MDM: CPT

## 2021-05-21 NOTE — ED QUICK NOTES
Patient states he was given a saline laxative at the [place]. Patient had a BM while here. Patient states he feels some relief.

## 2021-05-21 NOTE — ED QUICK NOTES
Patient given discharge instructions and prescriptions sent to pharmacy. Patient verbalized understanding. Wheeled out of ED by family.

## 2021-05-21 NOTE — ED INITIAL ASSESSMENT (HPI)
Patient notes last bowel movement 3 days ago, small. Denies abdominal pain but notes severe rectal pain after trying to have bowel movement.

## 2021-05-23 NOTE — ED PROVIDER NOTES
[facility]  Ridge [address] 62 [address] [facility], [address] [place] [address]    Consent for Operation    Date: __________________    Time: _______________    1.  I authorize the performance upon Jc Lopez the following operation:                                         Cir Patient Seen in: [facility] AND [facility] Emergency Department    History   Patient presents with:  Constipation      HPI    The patient presents to the ED complaining of constipation. He states that he last had a bowel movement 3 days ago.   Was at the [facility] cleaned with super sani-cloth germicidal wipes following the exam.     Physical Exam  Vitals and nursing note reviewed. Constitutional:       General: He is not in acute distress. Appearance: He is well-developed.    HENT:      Head: Normocephalic and procedure has been videotaped, the surgeon will obtain the original videotape. The hospital will not be responsible for storage or maintenance of this tape.     6. For the purpose of advancing medical education, I consent to the admittance of observers to t STATEMENTS REQUIRING INSERTION OR COMPLETION WERE FILLED IN.     Signature of Patient:   ___________________________    When the patient is a minor or mentally incompetent to give consent:  Signature of person authorized to consent for patient: ____________ treatment options and patient would like to go home with a GoLYTELY bowel prep. This was prescribed and he is stable for discharge. Additional verbal instructions and return precautions were discussed with the patient and/or caregiver.       Condition u Guidelines for Caring for Your Son's Plastibell Circumcision  · It is normal for a dark scab to form around the plastic. Let the scab fall off by itself. ? Allow the ring to fall off by itself.   The plastic ring usually falls off five to eight days aft

## 2024-05-12 ENCOUNTER — EXTERNAL RECORD (OUTPATIENT)
Dept: OTHER | Age: 79
End: 2024-05-12

## 2024-05-12 LAB
INTERNATIONAL NORMAL: 1.2 (ref 0.8–1.1)
PROTHROMBIN TIME (PATIENT): 13.4 SECONDS (ref 10.1–13.1)
PTT: 34 SECONDS (ref 25–36)
TROPONIN I HIGH SENSITIVITY: 379 PG/ML (ref 0–20)

## 2024-05-13 ENCOUNTER — EXTERNAL RECORD (OUTPATIENT)
Dept: HEALTH INFORMATION MANAGEMENT | Facility: OTHER | Age: 79
End: 2024-05-13

## 2024-05-13 ENCOUNTER — EXTERNAL RECORD (OUTPATIENT)
Dept: CARDIOLOGY | Age: 79
End: 2024-05-13

## 2024-05-13 ENCOUNTER — OFF PREMISE (OUTPATIENT)
Dept: HEALTH INFORMATION MANAGEMENT | Facility: OTHER | Age: 79
End: 2024-05-13

## 2024-05-13 LAB
*MEAN CORPUSCULAR HGB CONC: 34.8 G/DL (ref 32.5–35.8)
ANION GAP: 11 MEQ/L (ref 6–15)
BASOPHIL AUTOMATED: 0.3 %
BASOPHILS: 0 (ref 0–0.14)
BLOOD UREA NITROGEN (BUN): 34 MG/DL (ref 7–25)
BUN/CREATININE RATIO: 15.4 (ref 7.4–23)
CALCIUM, SERUM: 9.2 MG/DL (ref 8.6–10.3)
CARBON DIOXIDE: 16 MEQ/L (ref 21–31)
CHLORIDE, SERUM: 108 MMOL/L (ref 98–107)
CHRONIC KIDNEY DISEASE STAGE: ABNORMAL
CREATININE: 2.21 MG/DL (ref 0.7–1.3)
EOSINOPHIL AUTOMATED: 0.6 %
EOSINOPHILS: 0 (ref 0–0.6)
EST GLOMERULAR FILTRATION RATE: 30 ML/MIN
FERRITIN: 204 NG/ML (ref 24–336)
GLUCOSE: 102 MG/DL (ref 70–99)
HEMATOCRIT: 26.3 % (ref 38.6–49.2)
HEMOGLOBIN: 9.2 GM/DL (ref 13–17)
K (POTASSIUM, SERUM): 4.1 MMOL/L (ref 3.5–5.2)
LYMPHOCYTE AUTOMATED: 5.4 %
LYMPHOCYTES: 0.4 (ref 0.78–3.73)
MEAN CORPUSCULAR HGB: 34.8 PG (ref 26–34)
MEAN CORPUSCULAR VOL: 99.9 FL (ref 80–100)
MEAN PLATELET VOLUME: 8.2 FL (ref 6.8–10.2)
MG (MAGNESIUM, SERUM: 2.2 MG/DL (ref 1.6–2.6)
MONOCYTE AUTOMATED: 6.6 %
MONOCYTES: 0.5 (ref 0.17–1)
NA (SODIUM, SERUM): 135 MMOL/L (ref 133–144)
NEUTROPHIL ABSOLUTE: 6.5 (ref 1.91–7.6)
NEUTROPHIL AUTOMATED: 87.1 %
PLATELET COUNT: 161 K/MM3 (ref 150–450)
PTT: 57 SECONDS (ref 25–36)
PTT: 66 SECONDS (ref 25–36)
PTT: 92 SECONDS (ref 25–36)
RED BLOOD CELL COUNT: 2.63 M/MM3 (ref 4.34–5.6)
RED CELL DISTRIBUTIO: 13.7 % (ref 11.9–15.9)
TROPONIN I HIGH SENSITIVITY: 413 PG/ML (ref 0–20)
TROPONIN I HIGH SENSITIVITY: 418 PG/ML (ref 0–20)
WHITE BLOOD CELL COU: 7.5 K/MM3 (ref 4–11)

## 2024-05-14 ENCOUNTER — OFF PREMISE (OUTPATIENT)
Dept: HEALTH INFORMATION MANAGEMENT | Facility: OTHER | Age: 79
End: 2024-05-14

## 2024-05-14 LAB
% SATURATION: 22
ANION GAP: 10 MEQ/L (ref 6–15)
BLOOD UREA NITROGEN (BUN): 33 MG/DL (ref 7–25)
BUN/CREATININE RATIO: 13.5 (ref 7.4–23)
CALCIUM, SERUM: 8.8 MG/DL (ref 8.6–10.3)
CARBON DIOXIDE: 19 MEQ/L (ref 21–31)
CHLORIDE, SERUM: 108 MMOL/L (ref 98–107)
CHOLESTEROL HDL RATIO: 3.3
CHOLESTEROL: 117
CHRONIC KIDNEY DISEASE STAGE: ABNORMAL
CREATININE: 2.45 MG/DL (ref 0.7–1.3)
EST GLOMERULAR FILTRATION RATE: 26 ML/MIN
ESTIMATED AVERAGE GLUCOSE: 103
FOLIC ACID (FOLATE): > 22.3
GLUCOSE: 99 MG/DL (ref 70–99)
HDL CHOLESTEROL: 36
HEMOGLOBIN A1C (GLYCOSYLATED): 5.2
IRON BINDING CAPACIT: 306
IRON, SERUM (IRON): 67
K (POTASSIUM, SERUM): 3.7 MMOL/L (ref 3.5–5.2)
LDL CHOLESTEROL DIRECT: 72
NA (SODIUM, SERUM): 137 MMOL/L (ref 133–144)
NON HDL CHOLESTEROL: 81
PTT: 30 SECONDS (ref 25–36)
TRIGLYCERIDES: 43
UNSATURATED IRON BINDING CAP: 239
VITAMIN B12: 575
VLDL CHOLESTEROL: 9